# Patient Record
Sex: MALE | Race: WHITE | NOT HISPANIC OR LATINO | Employment: OTHER | RURAL
[De-identification: names, ages, dates, MRNs, and addresses within clinical notes are randomized per-mention and may not be internally consistent; named-entity substitution may affect disease eponyms.]

---

## 2017-04-20 ENCOUNTER — OFFICE VISIT (OUTPATIENT)
Dept: CARDIOLOGY | Facility: CLINIC | Age: 67
End: 2017-04-20

## 2017-04-20 VITALS
BODY MASS INDEX: 31.15 KG/M2 | HEART RATE: 52 BPM | SYSTOLIC BLOOD PRESSURE: 202 MMHG | DIASTOLIC BLOOD PRESSURE: 110 MMHG | WEIGHT: 230 LBS | HEIGHT: 72 IN

## 2017-04-20 DIAGNOSIS — I25.10 CORONARY ARTERY DISEASE INVOLVING NATIVE CORONARY ARTERY OF NATIVE HEART WITHOUT ANGINA PECTORIS: Primary | ICD-10-CM

## 2017-04-20 PROCEDURE — 99213 OFFICE O/P EST LOW 20 MIN: CPT | Performed by: INTERNAL MEDICINE

## 2017-04-20 PROCEDURE — 93000 ELECTROCARDIOGRAM COMPLETE: CPT | Performed by: INTERNAL MEDICINE

## 2017-04-20 NOTE — PROGRESS NOTES
Subjective:     Encounter Date:04/20/2017      Patient ID: Da Erickson is a 66 y.o. male.    Chief Complaint: CAD    History of Present Illness     Dear  _____,    I had the pleasure of seeing this patient in cardiac follow up today.  As you well know, he is a rafat 66-year-old man with history of coronary artery disease status post bypass surgery in 2013.  He had 6 grafts.  He has done well since that time without any angina.  He had a stress test last year that showed no evidence of myocardial ischemia.    His main complaint has been that of fatigue.  He denies any symptoms of sleep apnea.  This is a chronic complaint that has not changed in several years.    He continues to smoke 3-4 cigars a day.  He says that he is doing quite well without any angina.  He has no symptoms of claudication.        Review of Systems   All other systems reviewed and are negative.        ECG 12 Lead  Date/Time: 4/20/2017 11:16 AM  Performed by: TARAS CASAS  Authorized by: TARAS CASAS   Comparison: compared with previous ECG   Similar to previous ECG  Rhythm: sinus rhythm  BPM: 52  Q waves: III                 Objective:     Physical Exam   Constitutional: He is oriented to person, place, and time. He appears well-developed and well-nourished.   HENT:   Head: Normocephalic and atraumatic.   Neck: Normal range of motion. Neck supple.   Cardiovascular: Normal rate, regular rhythm and normal heart sounds.    Pulmonary/Chest: Effort normal and breath sounds normal.   Abdominal: Soft. Bowel sounds are normal.   Musculoskeletal: Normal range of motion.   Neurological: He is alert and oriented to person, place, and time.   Skin: Skin is warm and dry.   Psychiatric: He has a normal mood and affect. His behavior is normal. Thought content normal.   Vitals reviewed.      Lab Review:       Assessment:          Diagnosis Plan   1. Coronary artery disease involving native coronary artery of native heart without angina pectoris             Plan:        It was a pleasure to see this patient in cardiac follow up today.  He is doing well from a cardiac standpoint.  He has no complaints of angina or heart failure.  He is on a good medical regimen for coronary prevention.  I have made no changes at this time. He will see me again in 1 year or sooner if symptoms warrant.        Coronary Artery Disease  Assessment  • The patient has no angina    Plan  • Lifestyle modifications discussed include adhering to a heart healthy diet, avoidance of tobacco products, maintenance of a healthy weight, medication compliance, regular exercise and regular monitoring of cholesterol and blood pressure    Subjective - Objective  • There is a history of previous coronary artery bypass graft  • Current antiplatelet therapy includes aspirin 81 mg

## 2018-09-06 ENCOUNTER — OFFICE VISIT (OUTPATIENT)
Dept: CARDIOLOGY | Facility: CLINIC | Age: 68
End: 2018-09-06

## 2018-09-06 VITALS
BODY MASS INDEX: 31.28 KG/M2 | HEART RATE: 56 BPM | WEIGHT: 236 LBS | SYSTOLIC BLOOD PRESSURE: 152 MMHG | DIASTOLIC BLOOD PRESSURE: 80 MMHG | HEIGHT: 73 IN

## 2018-09-06 DIAGNOSIS — I25.10 CORONARY ARTERY DISEASE INVOLVING NATIVE CORONARY ARTERY OF NATIVE HEART WITHOUT ANGINA PECTORIS: Primary | ICD-10-CM

## 2018-09-06 PROCEDURE — 93000 ELECTROCARDIOGRAM COMPLETE: CPT | Performed by: INTERNAL MEDICINE

## 2018-09-06 PROCEDURE — 99213 OFFICE O/P EST LOW 20 MIN: CPT | Performed by: INTERNAL MEDICINE

## 2018-09-06 RX ORDER — PRAVASTATIN SODIUM 40 MG
40 TABLET ORAL DAILY
COMMUNITY
End: 2020-04-10 | Stop reason: ALTCHOICE

## 2018-09-06 NOTE — PROGRESS NOTES
"    Subjective:     Encounter Date:09/06/2018      Patient ID: Da Erickson is a 68 y.o. male.    Chief Complaint: CAD    History of Present Illness    Dear Nataliia Holloway,     I had the pleasure of seeing Da \"Layton\" Georgina in cardiac followup today.  As you well know, he is a rafat 68-year-old male with history of coronary artery disease status post six-vessel bypass surgery in 2013.  He had a followup stress test which revealed no evidence of myocardial ischemia.      He comes in for his yearly followup.  Since I have last seen him, he still smokes 3-4 cigars daily.  He says he gets lots of exercise going up and down stairs.  He golfs on a regular basis.  He is getting ready for deer hunting season.  He has had no real medical problems since our last visit.          Review of Systems   All other systems reviewed and are negative.        ECG 12 Lead  Date/Time: 9/6/2018 10:11 AM  Performed by: TARAS CASAS  Authorized by: TARAS CASAS   Comparison: compared with previous ECG   Similar to previous ECG  Rhythm: sinus rhythm  BPM: 56  Clinical impression: normal ECG               Objective:     Physical Exam   Constitutional: He is oriented to person, place, and time. He appears well-developed and well-nourished.   HENT:   Head: Normocephalic and atraumatic.   Neck: Normal range of motion. Neck supple.   Cardiovascular: Normal rate, regular rhythm and normal heart sounds.    Pulmonary/Chest: Effort normal and breath sounds normal.   Abdominal: Soft. Bowel sounds are normal.   Musculoskeletal: Normal range of motion.   Neurological: He is alert and oriented to person, place, and time.   Skin: Skin is warm and dry.   Psychiatric: He has a normal mood and affect. His behavior is normal. Thought content normal.   Vitals reviewed.      Lab Review:       Assessment:          Diagnosis Plan   1. Coronary artery disease involving native coronary artery of native heart without angina pectoris            Plan:       It was a " pleasure to see your patient in cardiac followup today.  He is doing very well from a cardiac standpoint without any complaints of angina.  I have talked with him about his cigar smoking.  I have encouraged him to continue to exercise on a regular basis.  He will see me again in one year or sooner if symptoms warrant.      Coronary Artery Disease  Assessment  • The patient has no angina    Plan  • Lifestyle modifications discussed include adhering to a heart healthy diet, avoidance of tobacco products, maintenance of a healthy weight, medication compliance, regular exercise and regular monitoring of cholesterol and blood pressure    Subjective - Objective  • There is a history of previous coronary artery bypass graft  • Current antiplatelet therapy includes aspirin 81 mg

## 2019-04-04 ENCOUNTER — OFFICE VISIT (OUTPATIENT)
Dept: CARDIOLOGY | Facility: CLINIC | Age: 69
End: 2019-04-04

## 2019-04-04 VITALS
WEIGHT: 245 LBS | DIASTOLIC BLOOD PRESSURE: 98 MMHG | SYSTOLIC BLOOD PRESSURE: 176 MMHG | BODY MASS INDEX: 32.47 KG/M2 | HEART RATE: 69 BPM | HEIGHT: 73 IN

## 2019-04-04 DIAGNOSIS — I25.810 CORONARY ARTERY DISEASE INVOLVING CORONARY BYPASS GRAFT OF NATIVE HEART WITHOUT ANGINA PECTORIS: ICD-10-CM

## 2019-04-04 DIAGNOSIS — R55 SYNCOPE AND COLLAPSE: Primary | ICD-10-CM

## 2019-04-04 PROCEDURE — 93000 ELECTROCARDIOGRAM COMPLETE: CPT | Performed by: INTERNAL MEDICINE

## 2019-04-04 PROCEDURE — 99214 OFFICE O/P EST MOD 30 MIN: CPT | Performed by: INTERNAL MEDICINE

## 2019-04-04 RX ORDER — SERTRALINE HYDROCHLORIDE 25 MG/1
25 TABLET, FILM COATED ORAL DAILY
COMMUNITY
End: 2020-04-10 | Stop reason: DRUGHIGH

## 2019-04-04 RX ORDER — LORAZEPAM 1 MG/1
1 TABLET ORAL DAILY
COMMUNITY
End: 2021-05-21

## 2019-04-04 RX ORDER — VITAMIN E 268 MG
400 CAPSULE ORAL DAILY
COMMUNITY
End: 2021-06-18

## 2019-04-04 NOTE — PROGRESS NOTES
Subjective:     Encounter Date:04/04/2019      Patient ID: Da Erickson is a 68 y.o. male.    Chief Complaint: syncope, CAD    History of Present Illness    Dear Dr. Cameron,    I had the pleasure of seeing the patient in cardiac followup today. As you well know, he is a rafat 68-year-old man with history of coronary artery disease, status post multivessel bypass surgery in 2013. He had a followup stress test that showed no ischemia.     He comes in for a followup visit. Since I have last seen him he reports being under a lot of stress. His wife is in a nursing home after suffering strokes and respiratory arrest. He is reconciling with an estranged daughter who is also very ill. He continues to smoke.    A few weeks ago he was sitting at his desk working on his computer when he passed out. He said there was no warning. There was no significant injury. He says that he has had a lot on his mind and he thinks that this was stress induced. He has no complaints of angina.         Review of Systems   All other systems reviewed and are negative.        ECG 12 Lead  Date/Time: 4/4/2019 2:13 PM  Performed by: Perry Lorenzo MD  Authorized by: Perry Lorenzo MD   Comparison: compared with previous ECG   Similar to previous ECG  Rhythm: sinus rhythm  BPM: 69    Clinical impression: normal ECG               Objective:     Physical Exam   Constitutional: He is oriented to person, place, and time. He appears well-developed and well-nourished.   HENT:   Head: Normocephalic and atraumatic.   Neck: Normal range of motion. Neck supple.   Cardiovascular: Normal rate, regular rhythm and normal heart sounds.   Pulmonary/Chest: Effort normal and breath sounds normal.   Abdominal: Soft. Bowel sounds are normal.   Musculoskeletal: Normal range of motion.   Neurological: He is alert and oriented to person, place, and time.   Skin: Skin is warm and dry.   Psychiatric: He has a normal mood and affect. His behavior is normal. Thought content  normal.   Vitals reviewed.      Lab Review:       Assessment:          Diagnosis Plan   1. Syncope and collapse     2. Coronary artery disease involving coronary bypass graft of native heart without angina pectoris            Plan:       It was a pleasure to see your patient in cardiac followup today. He is a rafat 68-year-old man with history of coronary artery disease. He had a syncopal episode which was likely vagal in origin given all the stress that he has been under. He states that he does not want to do any further evaluation at this time. I am inclined to agree, although certainly if he has another episode he should return to see me.     We had a long discussion regarding his management of his stressors and to try to improve his overall cardiac preventative regimen. He will see me again in 1 year or sooner if symptoms warrant.        Coronary Artery Disease  Assessment  • The patient has no angina    Plan  • Lifestyle modifications discussed include adhering to a heart healthy diet, avoidance of tobacco products, maintenance of a healthy weight, medication compliance, regular exercise and regular monitoring of cholesterol and blood pressure    Subjective - Objective  • There is a history of previous coronary artery bypass graft  • Current antiplatelet therapy includes aspirin 81 mg

## 2020-04-10 ENCOUNTER — OFFICE VISIT (OUTPATIENT)
Dept: CARDIOLOGY | Facility: CLINIC | Age: 70
End: 2020-04-10

## 2020-04-10 VITALS
BODY MASS INDEX: 33.24 KG/M2 | WEIGHT: 250.8 LBS | SYSTOLIC BLOOD PRESSURE: 126 MMHG | TEMPERATURE: 97.8 F | HEART RATE: 79 BPM | DIASTOLIC BLOOD PRESSURE: 69 MMHG | HEIGHT: 73 IN

## 2020-04-10 DIAGNOSIS — E78.5 HYPERLIPIDEMIA LDL GOAL <70: ICD-10-CM

## 2020-04-10 DIAGNOSIS — I10 ESSENTIAL HYPERTENSION: ICD-10-CM

## 2020-04-10 DIAGNOSIS — Z95.1 S/P CABG (CORONARY ARTERY BYPASS GRAFT): ICD-10-CM

## 2020-04-10 DIAGNOSIS — I25.10 CORONARY ARTERY DISEASE INVOLVING NATIVE CORONARY ARTERY OF NATIVE HEART WITHOUT ANGINA PECTORIS: Primary | ICD-10-CM

## 2020-04-10 DIAGNOSIS — Z72.0 TOBACCO USE: ICD-10-CM

## 2020-04-10 PROCEDURE — 99442 PR PHYS/QHP TELEPHONE EVALUATION 11-20 MIN: CPT | Performed by: INTERNAL MEDICINE

## 2020-04-10 RX ORDER — TAMSULOSIN HYDROCHLORIDE 0.4 MG/1
1 CAPSULE ORAL DAILY
COMMUNITY

## 2020-04-10 RX ORDER — ATORVASTATIN CALCIUM 40 MG/1
40 TABLET, FILM COATED ORAL DAILY
COMMUNITY
Start: 2020-02-14

## 2020-04-10 RX ORDER — SERTRALINE HYDROCHLORIDE 100 MG/1
100 TABLET, FILM COATED ORAL DAILY
COMMUNITY
Start: 2020-01-20 | End: 2021-06-18

## 2020-04-10 RX ORDER — OMEPRAZOLE 40 MG/1
40 CAPSULE, DELAYED RELEASE ORAL DAILY
COMMUNITY

## 2020-04-10 NOTE — PROGRESS NOTES
TELEPHONE FOLLOW UP VISIT    Subjective:     Encounter Date:04/10/2020      Patient ID: Da Erickson is a 69 y.o. male.    Chief Complaint:  History of Present Illness    This is a 69 year old with a history of coronary artery disease status post CABG x 6 in 9/2013, hypertension, hyperlipidemia, tobacco use, who presents for telephone follow up.     He previously was followed by Dr. Lorenzo.  Dr. Lorenzo initially saw him in 9/2013 when he presented with exertional angina and an abnormal stress test.  He developed significant ischemic changes and symptoms with the stress test and was evaluated by Dr. Lorenzo the same day.  Cardiac catheterization was recommended and showed multivessel coronary artery disease.  He was subsequently referred for CABG and underwent surgery with placement of a LIMA to LAD, DEJAH to RCA, SVG to diagonal branch, SVG to ramus, SVG to OM2, and SVG to OM3.  He did well until 9/2015 when he complained of worsening fatigue.  He underwent a stress test in 10/2015 which showed no evidence of ischemia.  A sleep study was recommended for ongoing symptoms but the patient declined due to no other symptoms of sleep apnea.  He last saw Dr. Lorenzo in 4/2019 at which time he was doing well.     He presents for telephone follow up in place of an office visit due to the ongoing COVID-19 pandemic.  The patient reports he has been doing well over the last year.  He has chronic dyspnea and fatigue with activity that is stable.  He denies any chest pain, palpitations, near syncope or syncope or lower extremity edema.  He continues to smoke 5 small cigars a day.  His main complaint is seasonal allergies for which he uses a nasal spray and receives allergy shots.  He has remained active playing a couple of rounds of golf recently and mowing his lawn without any significant issues.     Review of Systems   Constitution: Positive for malaise/fatigue.   HENT: Positive for congestion. Negative for hearing loss, hoarse voice, nosebleeds  and sore throat.    Eyes: Negative for pain.   Cardiovascular: Negative for chest pain, claudication, cyanosis, dyspnea on exertion, irregular heartbeat, leg swelling, near-syncope, orthopnea, palpitations, paroxysmal nocturnal dyspnea and syncope.   Respiratory: Positive for shortness of breath. Negative for snoring.    Endocrine: Negative for cold intolerance, heat intolerance, polydipsia, polyphagia and polyuria.   Skin: Negative for itching and rash.   Musculoskeletal: Negative for arthritis, falls, joint pain, joint swelling, muscle cramps, muscle weakness and myalgias.   Gastrointestinal: Negative for constipation, diarrhea, dysphagia, heartburn, hematemesis, hematochezia, melena, nausea and vomiting.   Genitourinary: Negative for frequency, hematuria and hesitancy.   Neurological: Negative for excessive daytime sleepiness, dizziness, headaches, light-headedness, numbness and weakness.   Psychiatric/Behavioral: Negative for depression. The patient is not nervous/anxious.          Current Outpatient Medications:   •  aspirin 81 MG tablet, Take 1 tablet by mouth daily., Disp: , Rfl:   •  atorvastatin (LIPITOR) 40 MG tablet, Take 40 mg by mouth Daily., Disp: , Rfl:   •  benazepril (LOTENSIN) 20 MG tablet, TAKE 1 TABLET BY MOUTH EVERY DAY, Disp: 90 tablet, Rfl: 0  •  LORazepam (ATIVAN) 1 MG tablet, Take 1 mg by mouth Daily., Disp: , Rfl:   •  nitroglycerin (NITROSTAT) 0.4 MG SL tablet, Place under the tongue. Take as directed., Disp: , Rfl:   •  omeprazole (priLOSEC) 40 MG capsule, Take 40 mg by mouth Daily., Disp: , Rfl:   •  sertraline (ZOLOFT) 100 MG tablet, Take 100 mg by mouth Daily., Disp: , Rfl:   •  tamsulosin (FLOMAX) 0.4 MG capsule 24 hr capsule, Take 1 capsule by mouth Daily., Disp: , Rfl:   •  vitamin E 400 UNIT capsule, Take 400 Units by mouth Daily., Disp: , Rfl:     Past Medical History:   Diagnosis Date   • Acid reflux    • Angina pectoris (CMS/HCC)    • Coronary artery disease    • Hyperlipidemia   "      Past Surgical History:   Procedure Laterality Date   • CORONARY ARTERY BYPASS GRAFT  09/16/2013    x6; Bilateral internal mammary artery grafts, LIMA to the LAd, DEJAH to RCA, seperate vein grafts saphenous reversed to diagonal branch of the LAD, Ramus intermedius, second marginal branch of the circumflex and third marginal branch of the circumflex.       Family History   Problem Relation Age of Onset   • Hypertension Mother    • Other Mother         Cardiac Disorder   • Heart disease Mother        Social History     Tobacco Use   • Smoking status: Current Every Day Smoker   • Smokeless tobacco: Never Used   • Tobacco comment: 5 small cigars daily   Substance Use Topics   • Alcohol use: Yes     Comment: 6 pack every month   • Drug use: No            Objective:     Visit Vitals  /69   Pulse 79   Temp 97.8 °F (36.6 °C)   Ht 185.4 cm (73\")   Wt 114 kg (250 lb 12.8 oz)   BMI 33.09 kg/m²         Physical Exam  Unable to perform due to telephone visit.         Assessment:          Diagnosis Plan   1. Coronary artery disease involving native coronary artery of native heart without angina pectoris     2. Essential hypertension     3. Hyperlipidemia LDL goal <70     4. S/P CABG (coronary artery bypass graft)     5. Tobacco use            Plan:       1. Coronary artery disease.  Appears to be stable and asymptomatic.  Continue current medical management.   2. Hypertension.  Well controlled on current regiment.  Continue current management.   3. Hyperlipidemia.  On atorvastatin for LDL goal of near or below 70.  Managed by Dr. Cameron.   4. Tobacco use.  Continues to smoke.  We discussed smoking cessation.     Will see the patient back in the office in 6 months.      This patient has consented to a telehealth visit via telephone. The visit was scheduled as a telephone visit to comply with patient safety concerns in accordance with CDC recommendations.  All vitals recorded within this visit are reported by the " patient.  I spent  25 minutes in total including but not limited to the 16 minutes spent in direct conversation with this patient.

## 2020-10-23 ENCOUNTER — OFFICE VISIT (OUTPATIENT)
Dept: CARDIOLOGY | Facility: CLINIC | Age: 70
End: 2020-10-23

## 2020-10-23 VITALS
RESPIRATION RATE: 16 BRPM | SYSTOLIC BLOOD PRESSURE: 132 MMHG | BODY MASS INDEX: 30.75 KG/M2 | HEIGHT: 73 IN | WEIGHT: 232 LBS | HEART RATE: 63 BPM | DIASTOLIC BLOOD PRESSURE: 80 MMHG

## 2020-10-23 VITALS
DIASTOLIC BLOOD PRESSURE: 80 MMHG | SYSTOLIC BLOOD PRESSURE: 132 MMHG | HEART RATE: 63 BPM | BODY MASS INDEX: 30.75 KG/M2 | WEIGHT: 232 LBS | HEIGHT: 73 IN

## 2020-10-23 DIAGNOSIS — Z72.0 TOBACCO USE: ICD-10-CM

## 2020-10-23 DIAGNOSIS — Z95.1 S/P CABG (CORONARY ARTERY BYPASS GRAFT): ICD-10-CM

## 2020-10-23 DIAGNOSIS — I25.10 CORONARY ARTERY DISEASE INVOLVING NATIVE CORONARY ARTERY OF NATIVE HEART WITHOUT ANGINA PECTORIS: Primary | ICD-10-CM

## 2020-10-23 DIAGNOSIS — E78.5 HYPERLIPIDEMIA LDL GOAL <70: ICD-10-CM

## 2020-10-23 DIAGNOSIS — I10 ESSENTIAL HYPERTENSION: ICD-10-CM

## 2020-10-23 PROCEDURE — 93000 ELECTROCARDIOGRAM COMPLETE: CPT | Performed by: INTERNAL MEDICINE

## 2020-10-23 PROCEDURE — 99214 OFFICE O/P EST MOD 30 MIN: CPT | Performed by: INTERNAL MEDICINE

## 2020-10-23 RX ORDER — MELOXICAM 7.5 MG/1
TABLET ORAL
COMMUNITY
Start: 2020-10-15 | End: 2021-05-21

## 2020-10-23 NOTE — PROGRESS NOTES
"    Subjective:     Encounter Date:10/23/2020      Patient ID: Da Erickson is a 70 y.o. male.    Chief Complaint:  History of Present Illness    This is a 70 year old with a history of coronary artery disease status post CABG x 6 in 9/2013, hypertension, hyperlipidemia, tobacco use, who presents for follow up.      He presents today for routine 6-month follow-up.  He denies any chest pain, shortness of breath, palpitations, orthopnea, near-syncope or syncope, or lower extremity edema.  He remains active without any significant issues.  He continues to smoke about 5 to 6 cigars a day and has no interest in quitting.  He states that \"I have to die from something\".    Prior History:  He previously was followed by Dr. Lorenzo.  Dr. Lorenzo initially saw him in 9/2013 when he presented with exertional angina and an abnormal stress test.  He developed significant ischemic changes and symptoms with the stress test and was evaluated by Dr. Lorenzo the same day.  Cardiac catheterization was recommended and showed multivessel coronary artery disease.  He was subsequently referred for CABG and underwent surgery with placement of a LIMA to LAD, DEJAH to RCA, SVG to diagonal branch, SVG to ramus, SVG to OM2, and SVG to OM3.  He did well until 9/2015 when he complained of worsening fatigue.  He underwent a stress test in 10/2015 which showed no evidence of ischemia.  A sleep study was recommended for ongoing symptoms but the patient declined due to no other symptoms of sleep apnea.  He last saw Dr. Lorenzo in 4/2019 at which time he was doing well.      My initial visit with him was in 4/2020 and was a telephone visit.  At the time he denied any significant issues and no changes were made to his management.     Review of Systems   Constitution: Negative for malaise/fatigue.   HENT: Negative for hearing loss, hoarse voice, nosebleeds and sore throat.    Eyes: Negative for pain.   Cardiovascular: Negative for chest pain, claudication, cyanosis, " dyspnea on exertion, irregular heartbeat, leg swelling, near-syncope, orthopnea, palpitations, paroxysmal nocturnal dyspnea and syncope.   Respiratory: Negative for shortness of breath and snoring.    Endocrine: Negative for cold intolerance, heat intolerance, polydipsia, polyphagia and polyuria.   Skin: Negative for itching and rash.   Musculoskeletal: Negative for arthritis, falls, joint pain, joint swelling, muscle cramps, muscle weakness and myalgias.   Gastrointestinal: Negative for constipation, diarrhea, dysphagia, heartburn, hematemesis, hematochezia, melena, nausea and vomiting.   Genitourinary: Negative for frequency, hematuria and hesitancy.   Neurological: Negative for excessive daytime sleepiness, dizziness, headaches, light-headedness, numbness and weakness.   Psychiatric/Behavioral: Negative for depression. The patient is not nervous/anxious.          Current Outpatient Medications:   •  aspirin 81 MG tablet, Take 1 tablet by mouth daily., Disp: , Rfl:   •  atorvastatin (LIPITOR) 40 MG tablet, Take 40 mg by mouth Daily., Disp: , Rfl:   •  benazepril (LOTENSIN) 20 MG tablet, TAKE 1 TABLET BY MOUTH EVERY DAY, Disp: 90 tablet, Rfl: 0  •  LORazepam (ATIVAN) 1 MG tablet, Take 1 mg by mouth Daily., Disp: , Rfl:   •  nitroglycerin (NITROSTAT) 0.4 MG SL tablet, Place under the tongue. Take as directed., Disp: , Rfl:   •  omeprazole (priLOSEC) 40 MG capsule, Take 40 mg by mouth Daily., Disp: , Rfl:   •  sertraline (ZOLOFT) 100 MG tablet, Take 100 mg by mouth Daily., Disp: , Rfl:   •  tamsulosin (FLOMAX) 0.4 MG capsule 24 hr capsule, Take 1 capsule by mouth Daily., Disp: , Rfl:   •  vitamin E 400 UNIT capsule, Take 400 Units by mouth Daily., Disp: , Rfl:     Past Medical History:   Diagnosis Date   • Acid reflux    • Angina pectoris (CMS/HCC)    • Coronary artery disease    • Hyperlipidemia        Past Surgical History:   Procedure Laterality Date   • CORONARY ARTERY BYPASS GRAFT  09/16/2013    x6; Bilateral  internal mammary artery grafts, LIMA to the LAd, DEJAH to RCA, seperate vein grafts saphenous reversed to diagonal branch of the LAD, Ramus intermedius, second marginal branch of the circumflex and third marginal branch of the circumflex.       Family History   Problem Relation Age of Onset   • Hypertension Mother    • Other Mother         Cardiac Disorder   • Heart disease Mother        Social History     Tobacco Use   • Smoking status: Current Every Day Smoker   • Smokeless tobacco: Never Used   • Tobacco comment: 5 small cigars daily   Substance Use Topics   • Alcohol use: Yes     Comment: 6 pack every month   • Drug use: No         ECG 12 Lead    Date/Time: 10/23/2020 11:41 AM  Performed by: Arabella Rucker MD  Authorized by: Arabella Rucker MD   Comparison: compared with previous ECG   Comparison to previous ECG: Ectopic atrial rhythm is new  Comments: Ectopic atrial rhythm               Objective:     There were no vitals taken for this visit.      Constitutional:       Appearance: Normal appearance. Well-developed.   Eyes:      General: Lids are normal.      Conjunctiva/sclera: Conjunctivae normal.      Pupils: Pupils are equal, round, and reactive to light.   HENT:      Head: Normocephalic and atraumatic.   Neck:      Musculoskeletal: Full passive range of motion without pain, normal range of motion and neck supple.      Vascular: No carotid bruit or JVD.      Lymphadenopathy: No cervical adenopathy.   Pulmonary:      Effort: Pulmonary effort is normal.      Breath sounds: Normal breath sounds.   Cardiovascular:      Normal rate. Regular rhythm.      No gallop.   Pulses:     Radial: 2+ bilaterally.  Edema:     Peripheral edema absent.   Abdominal:      Palpations: Abdomen is soft.   Skin:     General: Skin is warm and dry.   Neurological:      Mental Status: Alert and oriented to person, place, and time.             Assessment:          Diagnosis Plan   1. Coronary artery disease involving native coronary  artery of native heart without angina pectoris     2. Essential hypertension     3. Hyperlipidemia LDL goal <70     4. S/P CABG (coronary artery bypass graft)     5. Tobacco use            Plan:       1.  Coronary artery disease.  Remains stable and asymptomatic.  Continue medical management.  2.  Hypertension.  Well-controlled on his current medications.  Continue the same.  3.  Hyperlipidemia.  On atorvastatin for goal LDL of 70 or below.  This is managed by Dr. Chong.  4.  Tobacco use.  Patient expresses no interest in quitting.  We will continue to  him on the benefit of smoking cessation.    We will plan on seeing the patient back again in 1 year or sooner if further issues arise.

## 2021-05-03 ENCOUNTER — TELEPHONE (OUTPATIENT)
Dept: CARDIOLOGY | Facility: CLINIC | Age: 71
End: 2021-05-03

## 2021-05-21 ENCOUNTER — OFFICE VISIT (OUTPATIENT)
Dept: CARDIOLOGY | Facility: CLINIC | Age: 71
End: 2021-05-21

## 2021-05-21 VITALS
WEIGHT: 250.6 LBS | HEIGHT: 73 IN | SYSTOLIC BLOOD PRESSURE: 178 MMHG | DIASTOLIC BLOOD PRESSURE: 82 MMHG | BODY MASS INDEX: 33.21 KG/M2 | HEART RATE: 72 BPM | OXYGEN SATURATION: 97 %

## 2021-05-21 DIAGNOSIS — Z95.1 S/P CABG (CORONARY ARTERY BYPASS GRAFT): ICD-10-CM

## 2021-05-21 DIAGNOSIS — Z72.0 TOBACCO USE: ICD-10-CM

## 2021-05-21 DIAGNOSIS — I25.10 CORONARY ARTERY DISEASE INVOLVING NATIVE CORONARY ARTERY OF NATIVE HEART WITHOUT ANGINA PECTORIS: Primary | ICD-10-CM

## 2021-05-21 DIAGNOSIS — I10 ESSENTIAL HYPERTENSION: ICD-10-CM

## 2021-05-21 DIAGNOSIS — E78.5 HYPERLIPIDEMIA LDL GOAL <70: ICD-10-CM

## 2021-05-21 PROCEDURE — 99214 OFFICE O/P EST MOD 30 MIN: CPT | Performed by: INTERNAL MEDICINE

## 2021-05-21 NOTE — PROGRESS NOTES
Subjective:     Encounter Date:05/21/2021      Patient ID: Da Erickson is a 71 y.o. male.    Chief Complaint:  History of Present Illness    This is a 71 year old with a history of coronary artery disease status post CABG x 6 in 9/2013, hypertension, hyperlipidemia, tobacco use, who presents for follow up.      He presents today for preoperative evaluation.  Reports he is to undergo back surgery with Dr. Levi on 5/25.  He reports he has been feeling well.  He denies any chest pain, shortness of breath, palpitations, orthopnea, near-syncope or syncope, or lower extremity edema.  His activity is mainly limited by sciatica down his right lower extremity.  He continues to golf but is unable to walk very far distances because of his sciatica.  He can walk up a flight of stairs without stopping and without any significant issues.     Prior History:  He previously was followed by Dr. Lorenzo.  Dr. Lorenzo initially saw him in 9/2013 when he presented with exertional angina and an abnormal stress test.  He developed significant ischemic changes and symptoms with the stress test and was evaluated by Dr. Lorenzo the same day.  Cardiac catheterization was recommended and showed multivessel coronary artery disease.  He was subsequently referred for CABG and underwent surgery with placement of a LIMA to LAD, DEJAH to RCA, SVG to diagonal branch, SVG to ramus, SVG to OM2, and SVG to OM3.  He did well until 9/2015 when he complained of worsening fatigue.  He underwent a stress test in 10/2015 which showed no evidence of ischemia.  A sleep study was recommended for ongoing symptoms but the patient declined due to no other symptoms of sleep apnea.  He last saw Dr. Lorenzo in 4/2019 at which time he was doing well.      My initial visit with him was in 4/2020 and was a telephone visit.  At the time he denied any significant issues and no changes were made to his management.   He continues to smoke about 5 to 6 cigars a day and has expressed no  "interest in quitting.  He states that \"I have to die from something\".    Review of Systems   Constitutional: Positive for malaise/fatigue.   HENT: Negative for hearing loss, hoarse voice, nosebleeds and sore throat.    Eyes: Negative for pain.   Cardiovascular: Negative for chest pain, claudication, cyanosis, dyspnea on exertion, irregular heartbeat, leg swelling, near-syncope, orthopnea, palpitations, paroxysmal nocturnal dyspnea and syncope.   Respiratory: Negative for shortness of breath and snoring.    Endocrine: Negative for cold intolerance, heat intolerance, polydipsia, polyphagia and polyuria.   Skin: Negative for itching and rash.   Musculoskeletal: Positive for back pain. Negative for arthritis, falls, joint pain, joint swelling, muscle cramps, muscle weakness and myalgias.   Gastrointestinal: Negative for constipation, diarrhea, dysphagia, heartburn, hematemesis, hematochezia, melena, nausea and vomiting.   Genitourinary: Negative for frequency, hematuria and hesitancy.   Neurological: Negative for excessive daytime sleepiness, dizziness, headaches, light-headedness, numbness and weakness.   Psychiatric/Behavioral: Negative for depression. The patient is not nervous/anxious.          Current Outpatient Medications:   •  aspirin 81 MG tablet, Take 1 tablet by mouth daily., Disp: , Rfl:   •  atorvastatin (LIPITOR) 40 MG tablet, Take 40 mg by mouth Daily., Disp: , Rfl:   •  benazepril (LOTENSIN) 20 MG tablet, TAKE 1 TABLET BY MOUTH EVERY DAY, Disp: 90 tablet, Rfl: 0  •  nitroglycerin (NITROSTAT) 0.4 MG SL tablet, Place under the tongue. Take as directed., Disp: , Rfl:   •  omeprazole (priLOSEC) 40 MG capsule, Take 40 mg by mouth Daily., Disp: , Rfl:   •  sertraline (ZOLOFT) 100 MG tablet, Take 100 mg by mouth Daily., Disp: , Rfl:   •  tamsulosin (FLOMAX) 0.4 MG capsule 24 hr capsule, Take 1 capsule by mouth Daily., Disp: , Rfl:   •  vitamin E 400 UNIT capsule, Take 400 Units by mouth Daily., Disp: , Rfl: " "    Past Medical History:   Diagnosis Date   • Acid reflux    • Angina pectoris (CMS/HCC)    • Coronary artery disease    • Hyperlipidemia        Past Surgical History:   Procedure Laterality Date   • CORONARY ARTERY BYPASS GRAFT  09/16/2013    x6; Bilateral internal mammary artery grafts, LIMA to the LAd, DEJAH to RCA, seperate vein grafts saphenous reversed to diagonal branch of the LAD, Ramus intermedius, second marginal branch of the circumflex and third marginal branch of the circumflex.       Family History   Problem Relation Age of Onset   • Hypertension Mother    • Other Mother         Cardiac Disorder   • Heart disease Mother        Social History     Tobacco Use   • Smoking status: Current Every Day Smoker   • Smokeless tobacco: Never Used   • Tobacco comment: 5 small cigars daily   Substance Use Topics   • Alcohol use: Yes     Comment: 6 pack every month   • Drug use: No       Procedures       Objective:     Visit Vitals  /82 (BP Location: Right arm, Patient Position: Sitting, Cuff Size: Large Adult)   Pulse 72   Ht 185.4 cm (73\")   Wt 114 kg (250 lb 9.6 oz)   SpO2 97%   BMI 33.06 kg/m²         Constitutional:       Appearance: Normal appearance. Well-developed.   Eyes:      General: Lids are normal.      Conjunctiva/sclera: Conjunctivae normal.      Pupils: Pupils are equal, round, and reactive to light.   HENT:      Head: Normocephalic and atraumatic.   Neck:      Vascular: No carotid bruit or JVD.      Lymphadenopathy: No cervical adenopathy.   Pulmonary:      Effort: Pulmonary effort is normal.      Breath sounds: Normal breath sounds.   Cardiovascular:      Normal rate. Regular rhythm.      No gallop.   Pulses:     Radial: 2+ bilaterally.  Edema:     Peripheral edema absent.   Abdominal:      Palpations: Abdomen is soft.   Musculoskeletal:      Cervical back: Full passive range of motion without pain, normal range of motion and neck supple. Skin:     General: Skin is warm and dry. "   Neurological:      Mental Status: Alert and oriented to person, place, and time.             Assessment:          Diagnosis Plan   1. Coronary artery disease involving native coronary artery of native heart without angina pectoris     2. S/P CABG (coronary artery bypass graft)     3. Essential hypertension     4. Hyperlipidemia LDL goal <70     5. Tobacco use            Plan:         1.  Preoperative evaluation.  His EKG performed on 5/13 for preoperative evaluation is unremarkable on stable compared to his prior tracings.  He is not having any symptoms concerning for a new or worsening cardiac issue.  Although he is mainly limited by his back discomfort he is not having any symptoms with activity.  At this point I think it he can proceed with planned surgery without any further cardiac work-up or change in his management.  2.  Coronary artery disease.  History of CABG.  No anginal symptoms at this time.  EKG from preoperative work-up was unremarkable.  Continue current medical management.  3.  Hypertension.  Elevated in the office but he reports that it is normally well controlled.  Continue current management.  4.  Hyperlipidemia.  On atorvastatin for goal LDL of around 70 or below.  Lipid panel in 1/2021 showed his lipids were at goal with total cholesterol 123, triglycerides of 66, HDL of 59, and LDL of 51.  5.  Tobacco use.  Patient does not express any desire to quit.    I will see the patient back again in 6 months.

## 2021-06-18 ENCOUNTER — OFFICE VISIT (OUTPATIENT)
Dept: CARDIOLOGY | Facility: CLINIC | Age: 71
End: 2021-06-18

## 2021-06-18 VITALS
HEART RATE: 70 BPM | BODY MASS INDEX: 32.39 KG/M2 | WEIGHT: 244.4 LBS | HEIGHT: 73 IN | DIASTOLIC BLOOD PRESSURE: 82 MMHG | SYSTOLIC BLOOD PRESSURE: 162 MMHG

## 2021-06-18 DIAGNOSIS — I48.19 ATRIAL FIBRILLATION, PERSISTENT (HCC): Primary | ICD-10-CM

## 2021-06-18 DIAGNOSIS — Z72.0 TOBACCO USE: ICD-10-CM

## 2021-06-18 DIAGNOSIS — I25.10 CORONARY ARTERY DISEASE INVOLVING NATIVE CORONARY ARTERY OF NATIVE HEART WITHOUT ANGINA PECTORIS: ICD-10-CM

## 2021-06-18 DIAGNOSIS — Z95.1 S/P CABG (CORONARY ARTERY BYPASS GRAFT): ICD-10-CM

## 2021-06-18 DIAGNOSIS — E78.5 HYPERLIPIDEMIA LDL GOAL <70: ICD-10-CM

## 2021-06-18 DIAGNOSIS — I10 ESSENTIAL HYPERTENSION: ICD-10-CM

## 2021-06-18 PROCEDURE — 99214 OFFICE O/P EST MOD 30 MIN: CPT | Performed by: INTERNAL MEDICINE

## 2021-06-18 PROCEDURE — 93000 ELECTROCARDIOGRAM COMPLETE: CPT | Performed by: INTERNAL MEDICINE

## 2021-06-18 NOTE — PROGRESS NOTES
Subjective:     Encounter Date:06/18/2021      Patient ID: Da Erickson is a 71 y.o. male.    Chief Complaint:  History of Present Illness    This is a 71 year old with a history of coronary artery disease status post CABG x 6 in 9/2013, hypertension, hyperlipidemia, tobacco use, who presents for follow up.      I just saw the patient for preoperative evaluation on 5/21 before her back surgery with Dr. Levi on 5/25.    He denies any significant cardiac issues so he was cleared to proceed with surgery.      He underwent an L5-S1 uncomplicated laminectomy which ended up being about a 9-hour procedure.  Postoperatively he was found to be in atrial fibrillation with rapid ventricular rates.  He was started on nebivolol and rivaroxaban.  An echocardiogram was performed showing low normal left ventricular systolic function with an EF of 45 to 50%, reportedly inferior wall hypokinesis, mild left atrial, right atrial, and right ventricular enlargement, mild tricuspid regurgitation, and no significant valvular disease.      Since his surgery he saw Dr. Cameron in follow-up on 6/16/2021.  He was noted to have bilateral lower extremity edema.  He was set up for bilateral lower extremity venous Doppler ultrasound which was negative for DVT.  BNP was noted to be elevated at 2930.  He was started on furosemide 40 mg a day along with potassium chloride 10 mEq daily.    He reports some dyspnea on exertion but does not feel like this is very significant.  He is only taken a couple of doses of furosemide so far and still has lower extremity edema.  He denies any chest pain, palpitations, orthopnea, near-syncope or syncope.  There are rivaroxaban it is costing him over $400 a month.  It is unclear if he is on nebivolol at this time.     Prior History:  He previously was followed by Dr. Lorenzo.  Dr. Lorenzo initially saw him in 9/2013 when he presented with exertional angina and an abnormal stress test.  He developed significant  "ischemic changes and symptoms with the stress test and was evaluated by Dr. Lorenzo the same day.  Cardiac catheterization was recommended and showed multivessel coronary artery disease.  He was subsequently referred for CABG and underwent surgery with placement of a LIMA to LAD, DEJAH to RCA, SVG to diagonal branch, SVG to ramus, SVG to OM2, and SVG to OM3.  He did well until 9/2015 when he complained of worsening fatigue.  He underwent a stress test in 10/2015 which showed no evidence of ischemia.  A sleep study was recommended for ongoing symptoms but the patient declined due to no other symptoms of sleep apnea.  He last saw Dr. Lorenzo in 4/2019 at which time he was doing well.      My initial visit with him was in 4/2020 and was a telephone visit.  At the time he denied any significant issues and no changes were made to his management.   He continues to smoke about 5 to 6 cigars a day and has expressed no interest in quitting.  He states that \"I have to die from something\".    Review of Systems   Constitutional: Positive for malaise/fatigue.   HENT: Negative for hearing loss, hoarse voice, nosebleeds and sore throat.    Eyes: Negative for pain.   Cardiovascular: Positive for dyspnea on exertion and leg swelling. Negative for chest pain, claudication, cyanosis, irregular heartbeat, near-syncope, orthopnea, palpitations, paroxysmal nocturnal dyspnea and syncope.   Respiratory: Negative for shortness of breath and snoring.    Endocrine: Negative for cold intolerance, heat intolerance, polydipsia, polyphagia and polyuria.   Skin: Negative for itching and rash.   Musculoskeletal: Positive for back pain. Negative for arthritis, falls, joint pain, joint swelling, muscle cramps, muscle weakness and myalgias.   Gastrointestinal: Negative for constipation, diarrhea, dysphagia, heartburn, hematemesis, hematochezia, melena, nausea and vomiting.   Genitourinary: Negative for frequency, hematuria and hesitancy.   Neurological: Negative " for excessive daytime sleepiness, dizziness, headaches, light-headedness, numbness and weakness.   Psychiatric/Behavioral: Negative for depression. The patient is not nervous/anxious.          Current Outpatient Medications:   •  aspirin 81 MG tablet, Take 1 tablet by mouth daily., Disp: , Rfl:   •  atorvastatin (LIPITOR) 40 MG tablet, Take 40 mg by mouth Daily., Disp: , Rfl:   •  benazepril (LOTENSIN) 20 MG tablet, TAKE 1 TABLET BY MOUTH EVERY DAY, Disp: 90 tablet, Rfl: 0  •  nitroglycerin (NITROSTAT) 0.4 MG SL tablet, Place under the tongue. Take as directed., Disp: , Rfl:   •  omeprazole (priLOSEC) 40 MG capsule, Take 40 mg by mouth Daily., Disp: , Rfl:   •  tamsulosin (FLOMAX) 0.4 MG capsule 24 hr capsule, Take 1 capsule by mouth Daily., Disp: , Rfl:     Past Medical History:   Diagnosis Date   • Acid reflux    • Angina pectoris (CMS/HCC)    • Coronary artery disease    • Hyperlipidemia        Past Surgical History:   Procedure Laterality Date   • CORONARY ARTERY BYPASS GRAFT  09/16/2013    x6; Bilateral internal mammary artery grafts, LIMA to the LAd, DEJAH to RCA, seperate vein grafts saphenous reversed to diagonal branch of the LAD, Ramus intermedius, second marginal branch of the circumflex and third marginal branch of the circumflex.       Family History   Problem Relation Age of Onset   • Hypertension Mother    • Other Mother         Cardiac Disorder   • Heart disease Mother        Social History     Tobacco Use   • Smoking status: Current Every Day Smoker   • Smokeless tobacco: Never Used   • Tobacco comment: 5 small cigars daily   Substance Use Topics   • Alcohol use: Yes     Comment: 6 pack every month   • Drug use: No         ECG 12 Lead    Date/Time: 6/18/2021 4:03 PM  Performed by: Arabella Rucker MD  Authorized by: Arabella Rucker MD   Comparison: compared with previous ECG   Comparison to previous ECG: Atrial fibrillation is new  Rhythm: atrial fibrillation               Objective:     Visit  "Vitals  /82 (BP Location: Right arm, Patient Position: Sitting, Cuff Size: Large Adult)   Pulse 70   Ht 185.4 cm (73\")   Wt 111 kg (244 lb 6.4 oz)   BMI 32.24 kg/m²         Constitutional:       Appearance: Normal appearance. Well-developed.   Eyes:      General: Lids are normal.      Conjunctiva/sclera: Conjunctivae normal.      Pupils: Pupils are equal, round, and reactive to light.   HENT:      Head: Normocephalic and atraumatic.   Neck:      Vascular: No carotid bruit or JVD.      Lymphadenopathy: No cervical adenopathy.   Pulmonary:      Effort: Pulmonary effort is normal.      Breath sounds: Normal breath sounds.   Cardiovascular:      Normal rate. Irregularly irregular rhythm.      No gallop.   Pulses:     Radial: 2+ bilaterally.  Edema:     Peripheral edema present.     Ankle: bilateral 3+ edema of the ankle.     Feet: bilateral 3+ edema of the feet.  Abdominal:      Palpations: Abdomen is soft.   Musculoskeletal:      Cervical back: Full passive range of motion without pain, normal range of motion and neck supple. Skin:     General: Skin is warm and dry.   Neurological:      Mental Status: Alert and oriented to person, place, and time.             Assessment:          Diagnosis Plan   1. Atrial fibrillation, persistent (CMS/HCC)     2. Coronary artery disease involving native coronary artery of native heart without angina pectoris     3. S/P CABG (coronary artery bypass graft)     4. Essential hypertension     5. Hyperlipidemia LDL goal <70     6. Tobacco use            Plan:       1.  Persistent atrial fibrillation.  He peers to still be in atrial fibrillation today but his rates are well controlled.  Is unclear if he is still on the nebivolol at this time.  He does have an elevated [id number] score of 3.  He is currently on anticoagulation with rivaroxaban but this is quite expensive.  Unfortunately we do not have samples of rivaroxaban in our office today.  I discussed alternative options " including apixaban which he may run into the same problem with in regards to insurance coverage versus warfarin.  The patient is familiar with warfarin and has no interest in using this medication.  At this point I will provide him with apixaban samples to start after he completes his current supply of rivaroxaban.  In the meanwhile the patient will find out from his insurance company of apixaban is a more affordable option.  Otherwise we may need to consider looking into financial assistance.  2.  Acute diastolic congestive heart failure.  I reviewed his recent echocardiogram images and although this was a extremely poor study with limited visualization I believe his left ventricular systolic function is normal or at least low normal with an EF around 50%.  I suspect his volume overload may be due to the onset of atrial fibrillation and loss of atrial kick.  Agree with the addition of furosemide.  We will see how he improves with this.  3.  Coronary artery disease.  History of CABG.  EKG shows no ischemic changes.  Overall he is otherwise tolerated his recent surgery well.  4.  Hypertension.  Elevated in the office but normally well controlled.  We will continue to monitor on his current management.  5.  Hyperlipidemia.  On atorvastatin for goal LDL of around 70 or below.  His last lipid panel in January showed his lipids were at goal.  6.  Tobacco use.    I will see the patient back again in 3 months.

## 2021-06-29 NOTE — TELEPHONE ENCOUNTER
Pt called stating his xarelto will only cost hime $40/mo and therefor would like to continue. Please send to pharmacy.  Thanks [facility] RMA

## 2021-09-29 ENCOUNTER — TELEPHONE (OUTPATIENT)
Dept: CARDIOLOGY | Facility: CLINIC | Age: 71
End: 2021-09-29

## 2021-10-04 PROBLEM — M60.9 MYOSITIS: Status: ACTIVE | Noted: 2020-01-22

## 2021-10-04 PROBLEM — M99.59 INTERVERTEBRAL DISC STENOSIS OF NEURAL CANAL: Status: ACTIVE | Noted: 2020-04-29

## 2021-10-04 PROBLEM — M54.16 LUMBAR RADICULOPATHY: Status: ACTIVE | Noted: 2021-01-27

## 2021-10-04 PROBLEM — M50.30 DDD (DEGENERATIVE DISC DISEASE), CERVICAL: Status: ACTIVE | Noted: 2020-01-13

## 2021-10-04 PROBLEM — M47.819 SPONDYLOSIS WITHOUT MYELOPATHY: Status: ACTIVE | Noted: 2020-02-24

## 2021-10-04 RX ORDER — OXYCODONE HYDROCHLORIDE 5 MG/1
TABLET ORAL
COMMUNITY
Start: 2021-06-29 | End: 2021-10-08

## 2021-10-04 RX ORDER — LORAZEPAM 1 MG/1
TABLET ORAL
COMMUNITY
Start: 2021-09-28

## 2021-10-04 RX ORDER — FUROSEMIDE 40 MG/1
40 TABLET ORAL 2 TIMES DAILY
COMMUNITY
Start: 2021-09-30 | End: 2022-01-14

## 2021-10-04 RX ORDER — BENAZEPRIL HYDROCHLORIDE 40 MG/1
40 TABLET, FILM COATED ORAL DAILY
COMMUNITY
Start: 2021-08-26 | End: 2022-01-14

## 2021-10-04 RX ORDER — METHOCARBAMOL 750 MG/1
750 TABLET, FILM COATED ORAL 3 TIMES DAILY PRN
COMMUNITY
Start: 2021-08-01 | End: 2021-10-08

## 2021-10-04 RX ORDER — POTASSIUM CHLORIDE 750 MG/1
10 TABLET, FILM COATED, EXTENDED RELEASE ORAL 2 TIMES DAILY
COMMUNITY
Start: 2021-09-30 | End: 2022-01-14

## 2021-10-08 ENCOUNTER — OFFICE VISIT (OUTPATIENT)
Dept: CARDIOLOGY | Facility: CLINIC | Age: 71
End: 2021-10-08

## 2021-10-08 VITALS
SYSTOLIC BLOOD PRESSURE: 136 MMHG | HEIGHT: 73 IN | DIASTOLIC BLOOD PRESSURE: 70 MMHG | HEART RATE: 73 BPM | BODY MASS INDEX: 30.35 KG/M2 | WEIGHT: 229 LBS

## 2021-10-08 DIAGNOSIS — I48.19 ATRIAL FIBRILLATION, PERSISTENT (HCC): ICD-10-CM

## 2021-10-08 DIAGNOSIS — Z72.0 TOBACCO USE: ICD-10-CM

## 2021-10-08 DIAGNOSIS — Z95.1 S/P CABG (CORONARY ARTERY BYPASS GRAFT): ICD-10-CM

## 2021-10-08 DIAGNOSIS — E78.5 HYPERLIPIDEMIA LDL GOAL <70: ICD-10-CM

## 2021-10-08 DIAGNOSIS — I25.10 CORONARY ARTERY DISEASE INVOLVING NATIVE CORONARY ARTERY OF NATIVE HEART WITHOUT ANGINA PECTORIS: Primary | ICD-10-CM

## 2021-10-08 DIAGNOSIS — I10 ESSENTIAL HYPERTENSION: ICD-10-CM

## 2021-10-08 PROCEDURE — 93000 ELECTROCARDIOGRAM COMPLETE: CPT | Performed by: INTERNAL MEDICINE

## 2021-10-08 PROCEDURE — 99214 OFFICE O/P EST MOD 30 MIN: CPT | Performed by: INTERNAL MEDICINE

## 2021-10-08 NOTE — PROGRESS NOTES
Subjective:     Encounter Date:10/08/2021      Patient ID: Da Erickson is a 71 y.o. male.    Chief Complaint:  History of Present Illness    This is a 71 year old with a history of coronary artery disease status post CABG x 6 in 9/2013, hypertension, hyperlipidemia, tobacco use, who presents for follow up.      I just saw the patient for preoperative evaluation on 5/21 before her back surgery with Dr. Levi on 5/25.    He denies any significant cardiac issues so he was cleared to proceed with surgery.       He underwent an L5-S1 uncomplicated laminectomy which ended up being about a 9-hour procedure.  Postoperatively he was found to be in atrial fibrillation with rapid ventricular rates.  He was started on nebivolol and rivaroxaban.  An echocardiogram was performed showing low normal left ventricular systolic function with an EF of 45 to 50%, reportedly inferior wall hypokinesis, mild left atrial, right atrial, and right ventricular enlargement, mild tricuspid regurgitation, and no significant valvular disease.       Following his surgery he saw Dr. Cameron in follow-up on 6/16/2021.  He was noted to have bilateral lower extremity edema.  He was set up for bilateral lower extremity venous Doppler ultrasound which was negative for DVT.  BNP was noted to be elevated at 2930.  He was started on furosemide 40 mg a day along with potassium chloride 10 mEq daily.     By the time of follow-up office visit with me on 6/18/2021 he reported some nonsignificant dyspnea on exertion.  He was tolerating the rivaroxaban but complained of the cost.  It did not appear that he was taken the nebivolol.  He still appeared to be in atrial fibrillation with normal rate by the time of office visit.  Did not make any changes to his management at that time.  We did discuss other options for anticoagulation.  The patient declined warfarin.  We decided to look into the cost of apixaban.  Soon after that the patient contacted us  "letting us know that the rivaroxaban would only cost him about $40 a month.    He presents today for routine follow-up.  He reports that about a week ago he noted that his blood pressure cuff was not showing an irregular rhythm.  He has been tolerating the rivaroxaban but now is in the \"donut hole\" and the cost of the medication has increased again.  He denies any bleeding issues.  He denies any chest pain, shortness of breath, palpitation, orthopnea, near-syncope or syncope, or lower extremity edema.  He is active playing golf and deer hunting without any significant issues.     Prior History:  He previously was followed by Dr. Lorenzo.  Dr. Lorenzo initially saw him in 9/2013 when he presented with exertional angina and an abnormal stress test.  He developed significant ischemic changes and symptoms with the stress test and was evaluated by Dr. Lorenzo the same day.  Cardiac catheterization was recommended and showed multivessel coronary artery disease.  He was subsequently referred for CABG and underwent surgery with placement of a LIMA to LAD, DEJAH to RCA, SVG to diagonal branch, SVG to ramus, SVG to OM2, and SVG to OM3.  He did well until 9/2015 when he complained of worsening fatigue.  He underwent a stress test in 10/2015 which showed no evidence of ischemia.  A sleep study was recommended for ongoing symptoms but the patient declined due to no other symptoms of sleep apnea.  He last saw Dr. Lorenzo in 4/2019 at which time he was doing well.      My initial visit with him was in 4/2020 and was a telephone visit.  At the time he denied any significant issues and no changes were made to his management.   He continues to smoke about 5 to 6 cigars a day and has expressed no interest in quitting.  He states that \"I have to die from something\".    Review of Systems   Constitutional: Negative for malaise/fatigue.   HENT: Negative for hearing loss, hoarse voice, nosebleeds and sore throat.    Eyes: Negative for pain.   Cardiovascular: " Negative for chest pain, claudication, cyanosis, dyspnea on exertion, irregular heartbeat, leg swelling, near-syncope, orthopnea, palpitations, paroxysmal nocturnal dyspnea and syncope.   Respiratory: Negative for shortness of breath and snoring.    Endocrine: Negative for cold intolerance, heat intolerance, polydipsia, polyphagia and polyuria.   Skin: Negative for itching and rash.   Musculoskeletal: Negative for arthritis, falls, joint pain, joint swelling, muscle cramps, muscle weakness and myalgias.   Gastrointestinal: Negative for constipation, diarrhea, dysphagia, heartburn, hematemesis, hematochezia, melena, nausea and vomiting.   Genitourinary: Negative for frequency, hematuria and hesitancy.   Neurological: Negative for excessive daytime sleepiness, dizziness, headaches, light-headedness, numbness and weakness.   Psychiatric/Behavioral: Negative for depression. The patient is not nervous/anxious.          Current Outpatient Medications:   •  aspirin 81 MG tablet, Take 1 tablet by mouth daily., Disp: , Rfl:   •  atorvastatin (LIPITOR) 40 MG tablet, Take 40 mg by mouth Daily., Disp: , Rfl:   •  benazepril (LOTENSIN) 40 MG tablet, Take 40 mg by mouth Daily., Disp: , Rfl:   •  furosemide (LASIX) 40 MG tablet, Take 40 mg by mouth 2 (Two) Times a Day., Disp: , Rfl:   •  LORazepam (ATIVAN) 1 MG tablet, TAKE 1 TABLET BY MOUTH EVERY EVENING AS NEEDED, Disp: , Rfl:   •  nitroglycerin (NITROSTAT) 0.4 MG SL tablet, Place under the tongue. Take as directed., Disp: , Rfl:   •  omeprazole (priLOSEC) 40 MG capsule, Take 40 mg by mouth Daily., Disp: , Rfl:   •  potassium chloride 10 MEQ CR tablet, Take 10 mEq by mouth 2 (Two) Times a Day., Disp: , Rfl:   •  rivaroxaban (XARELTO) 20 MG tablet, Take 1 tablet by mouth Daily., Disp: 30 tablet, Rfl: 11  •  tamsulosin (FLOMAX) 0.4 MG capsule 24 hr capsule, Take 1 capsule by mouth Daily., Disp: , Rfl:     Past Medical History:   Diagnosis Date   • Acid reflux    • Angina pectoris  "(HCC)    • Coronary artery disease    • Hyperlipidemia        Past Surgical History:   Procedure Laterality Date   • CORONARY ARTERY BYPASS GRAFT  09/16/2013    x6; Bilateral internal mammary artery grafts, LIMA to the LAd, DEJAH to RCA, seperate vein grafts saphenous reversed to diagonal branch of the LAD, Ramus intermedius, second marginal branch of the circumflex and third marginal branch of the circumflex.       Family History   Problem Relation Age of Onset   • Hypertension Mother    • Other Mother         Cardiac Disorder   • Heart disease Mother        Social History     Tobacco Use   • Smoking status: Current Every Day Smoker   • Smokeless tobacco: Never Used   • Tobacco comment: 5 small cigars daily   Substance Use Topics   • Alcohol use: Yes     Comment: 6 pack every month   • Drug use: No         ECG 12 Lead    Date/Time: 10/8/2021 12:25 PM  Performed by: Arabella Rucker MD  Authorized by: Arabella Rucker MD   Comparison: compared with previous ECG   Comparison to previous ECG: Lateral t wave changes are new and atrial fibrillation not present  Rhythm: sinus rhythm  T inversion: I and aVL                 Objective:     Visit Vitals  /70   Pulse 73   Ht 185.4 cm (73\")   Wt 104 kg (229 lb)   BMI 30.21 kg/m²         Constitutional:       Appearance: Normal appearance. Well-developed.   Eyes:      General: Lids are normal.      Conjunctiva/sclera: Conjunctivae normal.      Pupils: Pupils are equal, round, and reactive to light.   HENT:      Head: Normocephalic and atraumatic.   Neck:      Vascular: No carotid bruit or JVD.      Lymphadenopathy: No cervical adenopathy.   Pulmonary:      Effort: Pulmonary effort is normal.      Breath sounds: Normal breath sounds.   Cardiovascular:      Normal rate. Regular rhythm.      No gallop.   Pulses:     Radial: 2+ bilaterally.  Edema:     Peripheral edema absent.   Abdominal:      Palpations: Abdomen is soft.   Musculoskeletal:      Cervical back: Full passive " range of motion without pain, normal range of motion and neck supple. Skin:     General: Skin is warm and dry.   Neurological:      Mental Status: Alert and oriented to person, place, and time.             Assessment:          Diagnosis Plan   1. Coronary artery disease involving native coronary artery of native heart without angina pectoris     2. S/P CABG (coronary artery bypass graft)     3. Essential hypertension     4. Hyperlipidemia LDL goal <70     5. Atrial fibrillation, persistent (HCC)     6. Tobacco use            Plan:       Coronary artery disease.  He is not having any symptoms.  His EKG however shows new lateral T wave inversions.  With his lack of symptoms I recommended just monitoring him for now.  Continue current medical management including aspirin and atorvastatin.  Explained to the importance of remaining on atorvastatin.  2.  Paroxysmal atrial fibrillation.  He appears to be in sinus rhythm again today.  Discussed options with the patient and we opted to monitor him for little bit longer to see if he has any further episodes of atrial fibrillation before considering discontinuation of his anticoagulation.  3.  Chronic diastolic congestive heart failure.  Well maintained on his current dose of furosemide.  4.  Hypertension.  Well-controlled on his current regimen medications.  5.  Hyperlipidemia.  On atorvastatin for an LDL of around 70 or below.  This is managed by Dr. Cameron.  6.  Tobacco use.  Patient expresses no interest in quitting.    I will plan on seeing the patient back again in 3 months.

## 2022-01-14 ENCOUNTER — OFFICE VISIT (OUTPATIENT)
Dept: CARDIOLOGY | Facility: CLINIC | Age: 72
End: 2022-01-14

## 2022-01-14 VITALS
HEIGHT: 73 IN | HEART RATE: 63 BPM | SYSTOLIC BLOOD PRESSURE: 150 MMHG | DIASTOLIC BLOOD PRESSURE: 82 MMHG | WEIGHT: 240 LBS | BODY MASS INDEX: 31.81 KG/M2

## 2022-01-14 DIAGNOSIS — I48.19 ATRIAL FIBRILLATION, PERSISTENT: ICD-10-CM

## 2022-01-14 DIAGNOSIS — I25.10 CORONARY ARTERY DISEASE INVOLVING NATIVE CORONARY ARTERY OF NATIVE HEART WITHOUT ANGINA PECTORIS: Primary | ICD-10-CM

## 2022-01-14 DIAGNOSIS — E78.5 HYPERLIPIDEMIA LDL GOAL <70: ICD-10-CM

## 2022-01-14 DIAGNOSIS — Z72.0 TOBACCO USE: ICD-10-CM

## 2022-01-14 DIAGNOSIS — Z95.1 S/P CABG (CORONARY ARTERY BYPASS GRAFT): ICD-10-CM

## 2022-01-14 DIAGNOSIS — I10 ESSENTIAL HYPERTENSION: ICD-10-CM

## 2022-01-14 PROCEDURE — 99214 OFFICE O/P EST MOD 30 MIN: CPT | Performed by: INTERNAL MEDICINE

## 2022-01-14 PROCEDURE — 93000 ELECTROCARDIOGRAM COMPLETE: CPT | Performed by: INTERNAL MEDICINE

## 2022-01-14 RX ORDER — AMLODIPINE BESYLATE 5 MG/1
5 TABLET ORAL DAILY
Qty: 30 TABLET | Refills: 5 | Status: SHIPPED | OUTPATIENT
Start: 2022-01-14 | End: 2022-04-01 | Stop reason: SDUPTHER

## 2022-01-14 RX ORDER — LISINOPRIL AND HYDROCHLOROTHIAZIDE 20; 12.5 MG/1; MG/1
2 TABLET ORAL DAILY
Qty: 60 TABLET | Refills: 5 | Status: SHIPPED | OUTPATIENT
Start: 2022-01-14 | End: 2022-08-03

## 2022-01-14 RX ORDER — HYDROCHLOROTHIAZIDE 25 MG/1
25 TABLET ORAL EVERY MORNING
COMMUNITY
Start: 2021-11-24 | End: 2022-01-14

## 2022-01-14 RX ORDER — ZOLPIDEM TARTRATE 5 MG/1
TABLET ORAL
COMMUNITY
Start: 2022-01-09

## 2022-01-14 NOTE — PROGRESS NOTES
Subjective:     Encounter Date:01/14/2022      Patient ID: Da Erickson is a 71 y.o. male.    Chief Complaint:  History of Present Illness    This is a 71 year old with a history of coronary artery disease status post CABG x 6 in 9/2013, hypertension, hyperlipidemia, paroxysmal atrial fibrillation, tobacco use, who presents for follow up.      He presents today for routine follow-up.  Since his last visit he has been monitoring his heart rhythm with his blood pressure cuff which indicates if he is in a regular rhythm.  He brings in a log of this today and it shows that he is intermittently had episodes of an irregular rhythm which presumably was atrial fibrillation at least a few times a month.  He denies any symptoms with these episodes.  It is unclear how long these episodes last for.    He otherwise denies any chest pain, shortness of breath, palpitations, orthopnea, near-syncope or syncope, or lower extremity edema.  He has put on some weight which she regained to following his spinal surgery last year.  He has been checking his blood pressures at home and notes that his systolics have been running in the 150s to 160s.  Prior to that it was even higher before he was started on hydrochlorothiazide 25 mg daily by Dr. Cameron 2 weeks ago.    He reports that he is no longer taking the furosemide and potassium chloride on a regular basis.     Prior History:  He previously was followed by Dr. Lorenzo.  Dr. Lorenzo initially saw him in 9/2013 when he presented with exertional angina and an abnormal stress test.  He developed significant ischemic changes and symptoms with the stress test and was evaluated by Dr. Lorenzo the same day.  Cardiac catheterization was recommended and showed multivessel coronary artery disease.  He was subsequently referred for CABG and underwent surgery with placement of a LIMA to LAD, DEJAH to RCA, SVG to diagonal branch, SVG to ramus, SVG to OM2, and SVG to OM3.  He did well until 9/2015 when he  "complained of worsening fatigue.  He underwent a stress test in 10/2015 which showed no evidence of ischemia.  A sleep study was recommended for ongoing symptoms but the patient declined due to no other symptoms of sleep apnea.  He last saw Dr. Lorenzo in 4/2019 at which time he was doing well.      My initial visit with him was in 4/2020 and was a telephone visit.  At the time he denied any significant issues and no changes were made to his management.   He continues to smoke about 5 to 6 cigars a day and has expressed no interest in quitting.  He states that \"I have to die from something\".    He underwent an L5-S1 uncomplicated laminectomy which ended up being about a 9-hour procedure.  Postoperatively he was found to be in atrial fibrillation with rapid ventricular rates.  He was started on nebivolol and rivaroxaban.  An echocardiogram was performed showing low normal left ventricular systolic function with an EF of 45 to 50%, reportedly inferior wall hypokinesis, mild left atrial, right atrial, and right ventricular enlargement, mild tricuspid regurgitation, and no significant valvular disease.       Following his surgery he saw Dr. Cameron in follow-up on 6/16/2021.  He was noted to have bilateral lower extremity edema.  He was set up for bilateral lower extremity venous Doppler ultrasound which was negative for DVT.  BNP was noted to be elevated at 2930.  He was started on furosemide 40 mg a day along with potassium chloride 10 mEq daily.     By the time of follow-up office visit with me on 6/18/2021 he reported some nonsignificant dyspnea on exertion.  He was tolerating the rivaroxaban but complained of the cost.  It did not appear that he was taken the nebivolol.  He still appeared to be in atrial fibrillation with normal rate by the time of office visit.  Did not make any changes to his management at that time.  We did discuss other options for anticoagulation.  The patient declined warfarin.  We decided to look " into the cost of apixaban.  Soon after that the patient contacted us letting us know that the rivaroxaban would only cost him about $40 a month.      Review of Systems   Constitutional: Positive for weight gain. Negative for malaise/fatigue.   HENT: Negative for hearing loss, hoarse voice, nosebleeds and sore throat.    Eyes: Negative for pain.   Cardiovascular: Negative for chest pain, claudication, cyanosis, dyspnea on exertion, irregular heartbeat, leg swelling, near-syncope, orthopnea, palpitations, paroxysmal nocturnal dyspnea and syncope.   Respiratory: Negative for shortness of breath and snoring.    Endocrine: Negative for cold intolerance, heat intolerance, polydipsia, polyphagia and polyuria.   Skin: Negative for itching and rash.   Musculoskeletal: Negative for arthritis, falls, joint pain, joint swelling, muscle cramps, muscle weakness and myalgias.   Gastrointestinal: Negative for constipation, diarrhea, dysphagia, heartburn, hematemesis, hematochezia, melena, nausea and vomiting.   Genitourinary: Negative for frequency, hematuria and hesitancy.   Neurological: Negative for excessive daytime sleepiness, dizziness, headaches, light-headedness, numbness and weakness.   Psychiatric/Behavioral: Negative for depression. The patient is not nervous/anxious.          Current Outpatient Medications:   •  aspirin 81 MG tablet, Take 1 tablet by mouth daily., Disp: , Rfl:   •  atorvastatin (LIPITOR) 40 MG tablet, Take 40 mg by mouth Daily., Disp: , Rfl:   •  benazepril (LOTENSIN) 40 MG tablet, Take 40 mg by mouth Daily., Disp: , Rfl:   •  furosemide (LASIX) 40 MG tablet, Take 40 mg by mouth 2 (Two) Times a Day., Disp: , Rfl:   •  hydroCHLOROthiazide (HYDRODIURIL) 25 MG tablet, Take 25 mg by mouth Every Morning., Disp: , Rfl:   •  nitroglycerin (NITROSTAT) 0.4 MG SL tablet, Place under the tongue. Take as directed., Disp: , Rfl:   •  omeprazole (priLOSEC) 40 MG capsule, Take 40 mg by mouth Daily., Disp: , Rfl:   •   "potassium chloride 10 MEQ CR tablet, Take 10 mEq by mouth 2 (Two) Times a Day., Disp: , Rfl:   •  rivaroxaban (XARELTO) 20 MG tablet, Take 1 tablet by mouth Daily., Disp: 30 tablet, Rfl: 11  •  tamsulosin (FLOMAX) 0.4 MG capsule 24 hr capsule, Take 1 capsule by mouth Daily., Disp: , Rfl:   •  zolpidem (AMBIEN) 5 MG tablet, TAKE 1-2 TABLET BY MOUTH EVERY EVENING AS NEEDED, Disp: , Rfl:   •  LORazepam (ATIVAN) 1 MG tablet, TAKE 1 TABLET BY MOUTH EVERY EVENING AS NEEDED, Disp: , Rfl:     Past Medical History:   Diagnosis Date   • Acid reflux    • Angina pectoris (HCC)    • Coronary artery disease    • Hyperlipidemia        Past Surgical History:   Procedure Laterality Date   • CORONARY ARTERY BYPASS GRAFT  09/16/2013    x6; Bilateral internal mammary artery grafts, LIMA to the LAd, DEJAH to RCA, seperate vein grafts saphenous reversed to diagonal branch of the LAD, Ramus intermedius, second marginal branch of the circumflex and third marginal branch of the circumflex.       Family History   Problem Relation Age of Onset   • Hypertension Mother    • Other Mother         Cardiac Disorder   • Heart disease Mother        Social History     Tobacco Use   • Smoking status: Current Every Day Smoker   • Smokeless tobacco: Never Used   • Tobacco comment: 5 small cigars daily   Substance Use Topics   • Alcohol use: Yes     Comment: 6 pack every month   • Drug use: No         ECG 12 Lead    Date/Time: 1/14/2022 10:07 AM  Performed by: Arabella Rucker MD  Authorized by: Arabella Rucker MD   Comparison: compared with previous ECG   Similar to previous ECG  Rhythm: sinus rhythm  Conduction: left posterior fascicular block               Objective:     Visit Vitals  /82   Pulse 63   Ht 185.4 cm (73\")   Wt 109 kg (240 lb)   BMI 31.66 kg/m²         Constitutional:       Appearance: Normal appearance. Well-developed.   Eyes:      General: Lids are normal.      Conjunctiva/sclera: Conjunctivae normal.      Pupils: Pupils are equal, " round, and reactive to light.   HENT:      Head: Normocephalic and atraumatic.   Neck:      Vascular: No carotid bruit or JVD.      Lymphadenopathy: No cervical adenopathy.   Pulmonary:      Effort: Pulmonary effort is normal.      Breath sounds: Normal breath sounds.   Cardiovascular:      Normal rate. Regular rhythm.      No gallop.   Pulses:     Radial: 2+ bilaterally.  Edema:     Peripheral edema absent.   Abdominal:      Palpations: Abdomen is soft.   Musculoskeletal:      Cervical back: Full passive range of motion without pain, normal range of motion and neck supple. Skin:     General: Skin is warm and dry.   Neurological:      Mental Status: Alert and oriented to person, place, and time.             Assessment:          Diagnosis Plan   1. Coronary artery disease involving native coronary artery of native heart without angina pectoris     2. S/P CABG (coronary artery bypass graft)     3. Essential hypertension     4. Hyperlipidemia LDL goal <70     5. Atrial fibrillation, persistent (HCC)     6. Tobacco use            Plan:       1.  Coronary artery disease.  Appears to be stable and asymptomatic.  His EKG shows stable nonspecific lateral T wave inversion.  Continue current medical management.  2.  Paroxysmal atrial fibrillation.  Presumably still having episodes of atrial fibrillation based on his blood pressure cuff indicating episodes of an irregular rhythm.  He otherwise is asymptomatic.  Recommend continuing current management including rivaroxaban for anticoagulation.  3.  Hypertension.  Blood pressures have risen now that he is gaining back the weight that he lost after his spinal surgery.  His blood pressures remain elevated despite starting hydrochlorothiazide recently.  I recommend adding amlodipine 5 mg daily to his regimen.  In addition the patient reports that his insurance will no longer be covering benazepril as much is lisinopril.  I will send in prescription in for  lisinopril-hydrochlorothiazide 20-12.5 mg tablets and ask him to take 2 tablets a day in addition to the amlodipine.  4.  Chronic diastolic congestive heart failure.  He is off of the furosemide for now.  We will see how he does with the hydrochlorothiazide.  5.  Hyperlipidemia.  On atorvastatin for goal LDL around 70 or below.  Last lipid panel showed that his lipids were at goal.  6.  Tobacco use.  Patient expresses no interest in quitting.    I will plan on seeing the patient back again in 6 months.

## 2022-03-25 ENCOUNTER — TELEPHONE (OUTPATIENT)
Dept: CARDIOLOGY | Facility: CLINIC | Age: 72
End: 2022-03-25

## 2022-03-25 NOTE — TELEPHONE ENCOUNTER
I called patient to discuss his questions/issues with xarelto.  Left a message asking for a call back.

## 2022-03-28 NOTE — TELEPHONE ENCOUNTER
The patient reports that he does not believe he is an episode of atrial fibrillation since January 15.  He is wondering if he can come off the Xarelto.  I recommended that we monitor him a little bit longer before making that call.  In the meanwhile he is run out of his Xarelto samples and has not been taking for the last 2 weeks.  When he came into the office last week we only had 50 mg tablets.  We will make sure that he receives 20 mg tablet samples.    Samina-can you see that weight get him 20 mg Xarelto samples this week?

## 2022-04-01 RX ORDER — AMLODIPINE BESYLATE 10 MG/1
10 TABLET ORAL DAILY
Qty: 30 TABLET | Refills: 5 | Status: SHIPPED | OUTPATIENT
Start: 2022-04-01 | End: 2022-04-04

## 2022-04-04 RX ORDER — AMLODIPINE BESYLATE 10 MG/1
10 TABLET ORAL DAILY
Qty: 90 TABLET | Refills: 1 | Status: SHIPPED | OUTPATIENT
Start: 2022-04-04 | End: 2022-10-31

## 2022-04-04 NOTE — TELEPHONE ENCOUNTER
Rx Refill Note  Requested Prescriptions     Pending Prescriptions Disp Refills    amLODIPine (NORVASC) 10 MG tablet [Pharmacy Med Name: AMLODIPINE BESYLATE 10MG TABLETS] 90 tablet      Sig: Take 1 tablet by mouth Daily.      Last office visit with prescribing clinician: 1/14/2022      Next office visit with prescribing clinician: 7/15/2022            Manasa Dunn RN  04/04/22, 11:02 EDT

## 2022-07-19 ENCOUNTER — TELEPHONE (OUTPATIENT)
Dept: CARDIOLOGY | Facility: CLINIC | Age: 72
End: 2022-07-19

## 2022-07-19 NOTE — TELEPHONE ENCOUNTER
I called pt 7/15 to reschedule his appt;he stated he quit taking xarelto because it was to expensive and has not had a episode since 1/15. Please advise MM

## 2022-07-20 NOTE — TELEPHONE ENCOUNTER
Amelie did you speak with the pt?  Samina states the pt is stating he is not going to take the xarelto.  [facility]

## 2022-07-20 NOTE — TELEPHONE ENCOUNTER
I spoke with Mr. Erickson. He checks his blood pressure 4-5 times a day and has not been alerted to having an irregular rhythm since January. I discussed with him the risks of stopping Xarelto and he acknowledges understanding and still wants to stop. He has a follow up in October and will call if anything changes in the meantime.

## 2022-08-03 NOTE — TELEPHONE ENCOUNTER
Last OV 1/14/22.  Next OV 10/21/22.  Labs 10/11/21.  Does not meet protocol.  Please advise.    RENETTA STANLEY

## 2022-08-03 NOTE — TELEPHONE ENCOUNTER
It looks like a switch him from 5 to 10 mg of amlodipine and sent in a new prescription.  We need to refuse the amlodipine 5 mg prescription refill.  I would go ahead and approve the lisinopril-hydrochlorothiazide refill.  I do not know how to do that when the refill request are connected.

## 2022-08-04 RX ORDER — LISINOPRIL AND HYDROCHLOROTHIAZIDE 20; 12.5 MG/1; MG/1
2 TABLET ORAL DAILY
Qty: 60 TABLET | Refills: 3 | Status: SHIPPED | OUTPATIENT
Start: 2022-08-04 | End: 2022-12-09

## 2022-08-04 RX ORDER — AMLODIPINE BESYLATE 5 MG/1
5 TABLET ORAL DAILY
Qty: 30 TABLET | Refills: 3 | OUTPATIENT
Start: 2022-08-04

## 2022-10-21 ENCOUNTER — OFFICE VISIT (OUTPATIENT)
Dept: CARDIOLOGY | Facility: CLINIC | Age: 72
End: 2022-10-21

## 2022-10-21 VITALS
OXYGEN SATURATION: 98 % | WEIGHT: 209.6 LBS | HEIGHT: 71 IN | DIASTOLIC BLOOD PRESSURE: 70 MMHG | SYSTOLIC BLOOD PRESSURE: 122 MMHG | BODY MASS INDEX: 29.34 KG/M2 | HEART RATE: 81 BPM

## 2022-10-21 DIAGNOSIS — I25.10 CORONARY ARTERY DISEASE INVOLVING NATIVE CORONARY ARTERY OF NATIVE HEART WITHOUT ANGINA PECTORIS: Primary | ICD-10-CM

## 2022-10-21 DIAGNOSIS — I10 ESSENTIAL HYPERTENSION: ICD-10-CM

## 2022-10-21 DIAGNOSIS — Z95.1 S/P CABG (CORONARY ARTERY BYPASS GRAFT): ICD-10-CM

## 2022-10-21 DIAGNOSIS — I48.19 ATRIAL FIBRILLATION, PERSISTENT: ICD-10-CM

## 2022-10-21 DIAGNOSIS — E78.5 HYPERLIPIDEMIA LDL GOAL <70: ICD-10-CM

## 2022-10-21 DIAGNOSIS — Z72.0 TOBACCO USE: ICD-10-CM

## 2022-10-21 PROCEDURE — 99214 OFFICE O/P EST MOD 30 MIN: CPT | Performed by: INTERNAL MEDICINE

## 2022-10-21 PROCEDURE — 93000 ELECTROCARDIOGRAM COMPLETE: CPT | Performed by: INTERNAL MEDICINE

## 2022-10-21 RX ORDER — FUROSEMIDE 20 MG/1
40 TABLET ORAL 2 TIMES DAILY
COMMUNITY
Start: 2022-09-15

## 2022-10-21 RX ORDER — POTASSIUM CHLORIDE 750 MG/1
10 TABLET, FILM COATED, EXTENDED RELEASE ORAL 2 TIMES DAILY
COMMUNITY
Start: 2022-07-14

## 2022-10-21 NOTE — PROGRESS NOTES
Subjective:     Encounter Date:10/21/2022      Patient ID: Da Erickson is a 72 y.o. male.    Chief Complaint:  History of Present Illness    This is a 72 year old with a history of coronary artery disease status post CABG x 6 in 9/2013, hypertension, hyperlipidemia, paroxysmal atrial fibrillation, tobacco use, who presents for follow up.      Follow-up in 1/2022 he reported for the most part that he was staying in a regular rhythm per his blood pressure cuff but did have intermittent episodes of irregular rhythm about a few times a week.  He denies any symptoms with these episodes.  He also reported that he has been started on hydrochlorothiazide for elevated blood pressures by Dr. Cameron.  At that office visit I recommended that the patient he did this for a period of time but then opted to stop it on his own in 7/2022 after he injured his foot and had prolonged bleeding.    The patient has not been checking his blood pressures or his rhythm for the last 3 months.  He has been busy remodeling a new house.  He feels well overall.  Denies any chest pain, shortness of breath, palpitations, orthopnea, near-syncope or syncope.  Previously he was taking his furosemide as needed.  He is now to take the furosemide on a daily basis to control his lower extremity edema.  Otherwise he feels well and has been able to keep up with the activity and the work involved was remodeling his home.     Prior History:  He previously was followed by Dr. Lorenzo.  Dr. Lorenzo initially saw him in 9/2013 when he presented with exertional angina and an abnormal stress test.  He developed significant ischemic changes and symptoms with the stress test and was evaluated by Dr. Lorenzo the same day.  Cardiac catheterization was recommended and showed multivessel coronary artery disease.  He was subsequently referred for CABG and underwent surgery with placement of a LIMA to LAD, DEJAH to RCA, SVG to diagonal branch, SVG to ramus, SVG to OM2, and SVG to  "OM3.  He did well until 9/2015 when he complained of worsening fatigue.  He underwent a stress test in 10/2015 which showed no evidence of ischemia.  A sleep study was recommended for ongoing symptoms but the patient declined due to no other symptoms of sleep apnea.  He last saw Dr. Lorenzo in 4/2019 at which time he was doing well.      My initial visit with him was in 4/2020 and was a telephone visit.  At the time he denied any significant issues and no changes were made to his management.   He continues to smoke about 5 to 6 cigars a day and has expressed no interest in quitting.  He states that \"I have to die from something\".     He underwent an L5-S1 uncomplicated laminectomy which ended up being about a 9-hour procedure.  Postoperatively he was found to be in atrial fibrillation with rapid ventricular rates.  He was started on nebivolol and rivaroxaban.  An echocardiogram was performed showing low normal left ventricular systolic function with an EF of 45 to 50%, reportedly inferior wall hypokinesis, mild left atrial, right atrial, and right ventricular enlargement, mild tricuspid regurgitation, and no significant valvular disease.       Following his surgery he saw Dr. Cameron in follow-up on 6/16/2021.  He was noted to have bilateral lower extremity edema.  He was set up for bilateral lower extremity venous Doppler ultrasound which was negative for DVT.  BNP was noted to be elevated at 2930.  He was started on furosemide 40 mg a day along with potassium chloride 10 mEq daily.     By the time of follow-up office visit with me on 6/18/2021 he reported some nonsignificant dyspnea on exertion.  He was tolerating the rivaroxaban but complained of the cost.  It did not appear that he was taken the nebivolol.  He still appeared to be in atrial fibrillation with normal rate by the time of office visit.  Did not make any changes to his management at that time.  We did discuss other options for anticoagulation.  The patient " declined warfarin.  We decided to look into the cost of apixaban.  Soon after that the patient contacted us letting us know that the rivaroxaban would only cost him about $40 a month.    Review of Systems   Constitutional: Positive for malaise/fatigue.   HENT: Negative for hearing loss, hoarse voice, nosebleeds and sore throat.    Eyes: Negative for pain.   Cardiovascular: Positive for leg swelling. Negative for chest pain, claudication, cyanosis, dyspnea on exertion, irregular heartbeat, near-syncope, orthopnea, palpitations, paroxysmal nocturnal dyspnea and syncope.   Respiratory: Negative for shortness of breath and snoring.    Endocrine: Negative for cold intolerance, heat intolerance, polydipsia, polyphagia and polyuria.   Skin: Negative for itching and rash.   Musculoskeletal: Negative for arthritis, falls, joint pain, joint swelling, muscle cramps, muscle weakness and myalgias.   Gastrointestinal: Negative for constipation, diarrhea, dysphagia, heartburn, hematemesis, hematochezia, melena, nausea and vomiting.   Genitourinary: Negative for frequency, hematuria and hesitancy.   Neurological: Positive for light-headedness. Negative for excessive daytime sleepiness, dizziness, headaches, numbness and weakness.   Psychiatric/Behavioral: Negative for depression. The patient is not nervous/anxious.          Current Outpatient Medications:   •  amLODIPine (NORVASC) 10 MG tablet, TAKE 1 TABLET BY MOUTH DAILY, Disp: 90 tablet, Rfl: 1  •  aspirin 81 MG tablet, Take 1 tablet by mouth daily., Disp: , Rfl:   •  atorvastatin (LIPITOR) 40 MG tablet, Take 40 mg by mouth Daily., Disp: , Rfl:   •  furosemide (LASIX) 20 MG tablet, Take 2 tablets by mouth 2 (Two) Times a Day., Disp: , Rfl:   •  lisinopril-hydrochlorothiazide (PRINZIDE,ZESTORETIC) 20-12.5 MG per tablet, TAKE 2 TABLETS BY MOUTH DAILY, Disp: 60 tablet, Rfl: 3  •  LORazepam (ATIVAN) 1 MG tablet, TAKE 1 TABLET BY MOUTH EVERY EVENING AS NEEDED, Disp: , Rfl:   •   "nitroglycerin (NITROSTAT) 0.4 MG SL tablet, Place under the tongue. Take as directed., Disp: , Rfl:   •  omeprazole (priLOSEC) 40 MG capsule, Take 40 mg by mouth Daily., Disp: , Rfl:   •  potassium chloride 10 MEQ CR tablet, Take 1 tablet by mouth 2 (Two) Times a Day., Disp: , Rfl:   •  rivaroxaban (XARELTO) 20 MG tablet, Take 1 tablet by mouth Daily., Disp: 30 tablet, Rfl: 11  •  tamsulosin (FLOMAX) 0.4 MG capsule 24 hr capsule, Take 1 capsule by mouth Daily., Disp: , Rfl:   •  zolpidem (AMBIEN) 5 MG tablet, TAKE 1-2 TABLET BY MOUTH EVERY EVENING AS NEEDED, Disp: , Rfl:     Past Medical History:   Diagnosis Date   • Acid reflux    • Angina pectoris (HCC)    • Coronary artery disease    • Hyperlipidemia        Past Surgical History:   Procedure Laterality Date   • CORONARY ARTERY BYPASS GRAFT  09/16/2013    x6; Bilateral internal mammary artery grafts, LIMA to the LAd, DEJAH to RCA, seperate vein grafts saphenous reversed to diagonal branch of the LAD, Ramus intermedius, second marginal branch of the circumflex and third marginal branch of the circumflex.       Family History   Problem Relation Age of Onset   • Hypertension Mother    • Other Mother         Cardiac Disorder   • Heart disease Mother        Social History     Tobacco Use   • Smoking status: Every Day   • Smokeless tobacco: Never   • Tobacco comments:     5 small cigars daily   Substance Use Topics   • Alcohol use: Yes     Comment: 6 pack every month   • Drug use: No         ECG 12 Lead    Date/Time: 10/21/2022 3:20 PM  Performed by: Arabella Rucker MD  Authorized by: Arabella Rucker MD   Comparison: compared with previous ECG   Comparison to previous ECG: Atrial fibrillation is new  Rhythm: atrial fibrillation  Ectopy: unifocal PVCs  Conduction: left posterior fascicular block               Objective:     Visit Vitals  /70 (BP Location: Right arm, Patient Position: Sitting, Cuff Size: Adult)   Pulse 81   Ht 180.3 cm (71\")   Wt 95.1 kg (209 lb " 9.6 oz)   SpO2 98%   BMI 29.23 kg/m²         Constitutional:       Appearance: Normal appearance. Well-developed.   HENT:      Head: Normocephalic and atraumatic.   Neck:      Vascular: No carotid bruit or JVD.   Pulmonary:      Effort: Pulmonary effort is normal.      Breath sounds: Normal breath sounds.   Cardiovascular:      Normal rate. Irregularly irregular rhythm.      No gallop.   Pulses:     Radial: 2+ bilaterally.  Edema:     Peripheral edema absent.   Abdominal:      Palpations: Abdomen is soft.   Skin:     General: Skin is warm and dry.   Neurological:      Mental Status: Alert and oriented to person, place, and time.           Assessment:          Diagnosis Plan   1. Coronary artery disease involving native coronary artery of native heart without angina pectoris        2. Atrial fibrillation, persistent (HCC)        3. Essential hypertension        4. Hyperlipidemia LDL goal <70        5. S/P CABG (coronary artery bypass graft)        6. Tobacco use               Plan:         1.  Paroxysmal atrial fibrillation.  This was previously paroxysmal.  He appears to be in atrial fibrillation today.  Unclear if his atrial fibrillation is still paroxysmal or now persistent.  The patient plans on starting to monitor his blood pressure and heart rhythm more routinely again.  Discussed the risk and benefits of anticoagulation again with the patient.  He is aware of the risk of stroke and certainly understands that the impact of a stroke potentially can be greater than bleeding.  I think like to monitor his rhythm and see if he is having more frequent or persistent atrial fibrillation before considering the resumption of rivaroxaban.   2.  Coronary artery disease.  Prior CABG.  No anginal symptoms at this time no significant EKG changes.  Continue medical management.  3.  Hypertension.  Well-controlled on his current regimen of medications.  Continue current regimen.  4.  Chronic diastolic congestive heart failure.   Fairly well controlled on daily furosemide.  5.  Hyperlipidemia.  On atorvastatin for goal of around 70 or below.  This is managed by Dr. Cameron.  6.  Tobacco use.  The patient has no interest in quitting.  He believes he is too old to quit at this point.    Per the patient's preference will arrange for 6-month follow-up in our [place] office.  In the meanwhile the patient opts to resume the rivaroxaban he will notify me.

## 2022-10-31 RX ORDER — AMLODIPINE BESYLATE 10 MG/1
10 TABLET ORAL DAILY
Qty: 90 TABLET | Refills: 1 | Status: SHIPPED | OUTPATIENT
Start: 2022-10-31

## 2022-12-09 DIAGNOSIS — I48.19 ATRIAL FIBRILLATION, PERSISTENT: ICD-10-CM

## 2022-12-09 DIAGNOSIS — I10 ESSENTIAL HYPERTENSION: Primary | ICD-10-CM

## 2022-12-09 DIAGNOSIS — E78.5 HYPERLIPIDEMIA, UNSPECIFIED HYPERLIPIDEMIA TYPE: ICD-10-CM

## 2022-12-09 RX ORDER — LISINOPRIL AND HYDROCHLOROTHIAZIDE 20; 12.5 MG/1; MG/1
2 TABLET ORAL DAILY
Qty: 60 TABLET | Refills: 3 | Status: SHIPPED | OUTPATIENT
Start: 2022-12-09

## 2022-12-09 NOTE — TELEPHONE ENCOUNTER
I will refill his medication for now.  I placed orders for labs in epic.  Can you see that these are faxed and asked the patient to have these drawn at [facility]?

## 2022-12-09 NOTE — TELEPHONE ENCOUNTER
Last OV 10/21/22.  Next OV 4/19/23.  Labs 10/11/21.  [facility] meet protocol. [facility]

## 2023-02-06 ENCOUNTER — TELEPHONE (OUTPATIENT)
Dept: CARDIOLOGY | Facility: CLINIC | Age: 73
End: 2023-02-06

## 2023-02-06 NOTE — TELEPHONE ENCOUNTER
Caller: Da Erickson    Relationship: Self    Best call back number: [phone]    What is the best time to reach you: ANY    What was the call regarding: PT KEEPS GETTING CALLS WHILE HE IS NO LONGER A PT AND TRYING TO SEE WHY HES GETTING CALLS STILL. HE GOT ONE LAST WEEK AND JUST THIS MORNING (2.6.23)    Do you require a callback: IF NEEDED

## 2023-02-08 NOTE — TELEPHONE ENCOUNTER
Spoke with pt   He has switched to Dr. Mccabe and he does not need this refilled, his PCP refilled this for him until he sees Dr. Mccabe on 3/6/23

## 2023-05-01 RX ORDER — AMLODIPINE BESYLATE 10 MG/1
10 TABLET ORAL DAILY
Qty: 90 TABLET | Refills: 1 | Status: SHIPPED | OUTPATIENT
Start: 2023-05-01